# Patient Record
Sex: MALE | Race: WHITE | Employment: FULL TIME | URBAN - METROPOLITAN AREA
[De-identification: names, ages, dates, MRNs, and addresses within clinical notes are randomized per-mention and may not be internally consistent; named-entity substitution may affect disease eponyms.]

---

## 2017-05-16 ENCOUNTER — OFFICE VISIT (OUTPATIENT)
Dept: CARDIOLOGY CLINIC | Age: 57
End: 2017-05-16

## 2017-05-16 VITALS
DIASTOLIC BLOOD PRESSURE: 70 MMHG | WEIGHT: 277.12 LBS | SYSTOLIC BLOOD PRESSURE: 124 MMHG | HEIGHT: 73 IN | BODY MASS INDEX: 36.73 KG/M2 | HEART RATE: 98 BPM | OXYGEN SATURATION: 94 %

## 2017-05-16 DIAGNOSIS — R07.89 ATYPICAL CHEST PAIN: Primary | ICD-10-CM

## 2017-05-16 DIAGNOSIS — E78.5 DYSLIPIDEMIA: ICD-10-CM

## 2017-05-16 DIAGNOSIS — F17.210 CIGARETTE NICOTINE DEPENDENCE WITHOUT COMPLICATION: ICD-10-CM

## 2017-05-16 PROCEDURE — 99204 OFFICE O/P NEW MOD 45 MIN: CPT | Performed by: INTERNAL MEDICINE

## 2017-05-19 ENCOUNTER — HOSPITAL ENCOUNTER (OUTPATIENT)
Dept: NON INVASIVE DIAGNOSTICS | Age: 57
Discharge: HOME | End: 2017-05-19
Attending: INTERNAL MEDICINE | Admitting: INTERNAL MEDICINE

## 2018-03-19 ENCOUNTER — OFFICE VISIT (OUTPATIENT)
Dept: FAMILY MEDICINE CLINIC | Age: 58
End: 2018-03-19

## 2018-03-19 VITALS
TEMPERATURE: 98.4 F | WEIGHT: 276.4 LBS | DIASTOLIC BLOOD PRESSURE: 84 MMHG | SYSTOLIC BLOOD PRESSURE: 118 MMHG | HEIGHT: 73 IN | BODY MASS INDEX: 36.63 KG/M2

## 2018-03-19 DIAGNOSIS — Z12.11 SCREENING FOR COLON CANCER: ICD-10-CM

## 2018-03-19 DIAGNOSIS — Z00.00 PHYSICAL EXAM, ANNUAL: Primary | ICD-10-CM

## 2018-03-19 PROCEDURE — 99386 PREV VISIT NEW AGE 40-64: CPT | Performed by: INTERNAL MEDICINE

## 2018-03-19 ASSESSMENT — ENCOUNTER SYMPTOMS
SINUS PAIN: 0
ABDOMINAL PAIN: 0
APNEA: 0
DIARRHEA: 0
WHEEZING: 0
BLOOD IN STOOL: 0
COUGH: 0
SHORTNESS OF BREATH: 0
SINUS PRESSURE: 0
RHINORRHEA: 0
CONSTIPATION: 0

## 2018-03-19 ASSESSMENT — PATIENT HEALTH QUESTIONNAIRE - PHQ9
2. FEELING DOWN, DEPRESSED OR HOPELESS: 0
SUM OF ALL RESPONSES TO PHQ QUESTIONS 1-9: 0
1. LITTLE INTEREST OR PLEASURE IN DOING THINGS: 0
SUM OF ALL RESPONSES TO PHQ9 QUESTIONS 1 & 2: 0

## 2018-03-19 NOTE — PROGRESS NOTES
Subjective:      Patient ID: Ruddy Blackwood is a 60 y.o. male. HPI   Chief Complaint   Patient presents with   [facility]     patient denies any complaints at this time and states he will have fasting labs done at work on 4/9/2018. discuss smoking cessation. patient request Rx: Chantix     Ruddy Blackwood is a 60 y.o. male with the following history as recorded in [employer]: There are no active problems to display for this patient. No current outpatient prescriptions on file. No current facility-administered medications for this visit. Allergies: Patient has no known allergies. Past Medical History:   Diagnosis Date    Nicotine addiction      Past Surgical History:   Procedure Laterality Date    APPENDECTOMY      HAND SURGERY Left     JOINT REPLACEMENT Right     knee    KNEE SURGERY Bilateral     TONSILLECTOMY AND ADENOIDECTOMY       Family History   Problem Relation Age of Onset    Diabetes Mother     Colon Cancer Father      Social History   Substance Use Topics    Smoking status: Current Every Day Smoker     Packs/day: 1.00    Smokeless tobacco: Never Used      Comment: pt is thinking about quitting    Alcohol use Yes      Comment: occasional use       Review of Systems   Constitutional: Negative for chills, diaphoresis, fatigue and fever. HENT: Negative for congestion, postnasal drip, rhinorrhea, sinus pain and sinus pressure. Eyes: Negative for visual disturbance. Respiratory: Negative for apnea, cough, shortness of breath and wheezing. Cardiovascular: Negative for chest pain and palpitations. Gastrointestinal: Negative for abdominal pain, blood in stool, constipation and diarrhea. Endocrine: Negative for polyuria. Genitourinary: Negative for decreased urine volume and discharge. Musculoskeletal: Negative for arthralgias. Neurological: Negative for dizziness, light-headedness and headaches. Hematological: Negative for adenopathy.        Objective: Physical Exam   Constitutional: He is oriented to person, place, and time. He appears well-developed and well-nourished. No distress. HENT:   Head: Normocephalic and atraumatic. Right Ear: External ear normal.   Left Ear: External ear normal.   Mouth/Throat: Oropharynx is clear and moist.   Eyes: Conjunctivae are normal. Pupils are equal, round, and reactive to light. Right eye exhibits no discharge. Left eye exhibits no discharge. Neck: No tracheal deviation present. No thyromegaly present. Cardiovascular: Normal rate. No murmur heard. Pulmonary/Chest: Effort normal and breath sounds normal. No respiratory distress. He has no wheezes. He has no rales. Abdominal: He exhibits no distension. There is no tenderness. There is no rebound and no guarding. Musculoskeletal: He exhibits no edema. Neurological: He is alert and oriented to person, place, and time. No cranial nerve deficit. Coordination normal.   Skin: No rash noted. He is not diaphoretic. Psychiatric: He has a normal mood and affect. His behavior is normal. Judgment and thought content normal.       Assessment:      1. Physical exam, annual     2. Screening for colon cancer  [facility] - Karan Plascencia MD ([facility])         Plan:      No outpatient encounter prescriptions on file as of 3/19/2018. No facility-administered encounter medications on file as of 3/19/2018.       Orders Placed This Encounter   Procedures   Marky Kelly MD ([facility])     Referral Priority:   Routine     Referral Type:   Consult for Advice and Opinion     Referral Reason:   Specialty Services Required     Referred to Provider:   Barbara Chen MD     Requested Specialty:   Gastroenterology     Number of Visits Requested:   1

## 2018-04-18 PROBLEM — Z00.00 PHYSICAL EXAM, ANNUAL: Status: RESOLVED | Noted: 2018-03-19 | Resolved: 2018-04-18

## 2019-04-17 ENCOUNTER — OFFICE VISIT (OUTPATIENT)
Dept: FAMILY MEDICINE CLINIC | Age: 59
End: 2019-04-17
Payer: COMMERCIAL

## 2019-04-17 VITALS
SYSTOLIC BLOOD PRESSURE: 124 MMHG | WEIGHT: 271.6 LBS | BODY MASS INDEX: 36 KG/M2 | HEIGHT: 73 IN | DIASTOLIC BLOOD PRESSURE: 78 MMHG | TEMPERATURE: 97.3 F

## 2019-04-17 DIAGNOSIS — Z12.5 SCREENING PSA (PROSTATE SPECIFIC ANTIGEN): ICD-10-CM

## 2019-04-17 DIAGNOSIS — Z00.00 PHYSICAL EXAM: Primary | ICD-10-CM

## 2019-04-17 DIAGNOSIS — Z13.220 SCREENING FOR LIPID DISORDERS: ICD-10-CM

## 2019-04-17 DIAGNOSIS — Z11.59 NEED FOR HEPATITIS C SCREENING TEST: ICD-10-CM

## 2019-04-17 DIAGNOSIS — Z12.11 SCREENING FOR COLON CANCER: ICD-10-CM

## 2019-04-17 DIAGNOSIS — Z13.1 SCREENING FOR DIABETES MELLITUS: ICD-10-CM

## 2019-04-17 LAB
CHOLESTEROL, TOTAL: 163 MG/DL (ref 0–199)
GLUCOSE BLD-MCNC: 106 MG/DL (ref 70–99)
HDLC SERPL-MCNC: 39 MG/DL (ref 40–60)
HEPATITIS C ANTIBODY INTERPRETATION: NORMAL
LDL CHOLESTEROL CALCULATED: 101 MG/DL
PROSTATE SPECIFIC ANTIGEN: 1.86 NG/ML (ref 0–4)
TRIGL SERPL-MCNC: 115 MG/DL (ref 0–150)
VLDLC SERPL CALC-MCNC: 23 MG/DL

## 2019-04-17 PROCEDURE — 99396 PREV VISIT EST AGE 40-64: CPT | Performed by: INTERNAL MEDICINE

## 2019-04-17 ASSESSMENT — ENCOUNTER SYMPTOMS
VOMITING: 0
SORE THROAT: 0
DIARRHEA: 0
WHEEZING: 0
CONSTIPATION: 0
ABDOMINAL PAIN: 0
SINUS PRESSURE: 0
NAUSEA: 0
APNEA: 0
SHORTNESS OF BREATH: 0
BLOOD IN STOOL: 0
RHINORRHEA: 0
SINUS PAIN: 0
COUGH: 0

## 2019-04-17 ASSESSMENT — PATIENT HEALTH QUESTIONNAIRE - PHQ9
SUM OF ALL RESPONSES TO PHQ QUESTIONS 1-9: 0
SUM OF ALL RESPONSES TO PHQ9 QUESTIONS 1 & 2: 0
2. FEELING DOWN, DEPRESSED OR HOPELESS: 0
1. LITTLE INTEREST OR PLEASURE IN DOING THINGS: 0
SUM OF ALL RESPONSES TO PHQ QUESTIONS 1-9: 0

## 2019-04-17 NOTE — PROGRESS NOTES
There is no immunization history on file for this patient.
Objective:   Physical Exam   Constitutional: He is oriented to person, place, and time. He appears well-developed and well-nourished. No distress. HENT:   Head: Normocephalic and atraumatic. Right Ear: External ear normal.   Left Ear: External ear normal.   Nose: Nose normal.   Eyes: Pupils are equal, round, and reactive to light. EOM are normal. Right eye exhibits no discharge. Left eye exhibits no discharge. No scleral icterus. Neck: No JVD present. No tracheal deviation present. No thyromegaly present. Cardiovascular: Normal rate and regular rhythm. No murmur heard. Pulmonary/Chest: Effort normal and breath sounds normal. No respiratory distress. He has no wheezes. Abdominal: Soft. He exhibits no distension and no mass. There is no tenderness. There is no rebound and no guarding. Musculoskeletal: He exhibits no edema or deformity. Lymphadenopathy:     He has no cervical adenopathy. Neurological: He is alert and oriented to person, place, and time. No cranial nerve deficit. Skin: No rash noted. He is not diaphoretic. No erythema. Psychiatric: He has a normal mood and affect. His behavior is normal. Judgment and thought content normal.       Assessment:       Diagnosis Orders   1. Physical exam     2. Screening for lipid disorders  Lipid Panel   3. Screening for diabetes mellitus  Glucose   4. Screening for colon cancer     5. Need for hepatitis C screening test  Hepatitis C Antibody   6. Screening PSA (prostate specific antigen)  Psa screening         Plan:      No outpatient encounter medications on file as of 4/17/2019. No facility-administered encounter medications on file as of 4/17/2019.       Orders Placed This Encounter   Procedures    Lipid Panel     Standing Status:   Future     Standing Expiration Date:   4/17/2020     Order Specific Question:   Is Patient Fasting?/# of Hours     Answer:   12    Glucose     Standing Status:   Future     Standing Expiration Date:

## 2019-04-17 NOTE — PATIENT INSTRUCTIONS
Thank you for choosing [facility]. Please bring a current list of medications to every appointment. Please contact your pharmacy for any prescription refill(s) that you are requesting. Cigarette Smoking and Its Health Risks   GENERAL INFORMATION:   Smoking and your health: Cigarette smoking is the most preventable cause of illness and death in the [facility]. A large number of Americans smoke cigarettes, and each year more than one million children and adults start smoking cigarettes. Many people die every year from illnesses caused by smoking. People who smoke die earlier than those who do not smoke. The risk of disease increases if you smoke a lot, inhale deeply, or have smoked many years. Why are cigarettes bad for you? Cigarettes are filled with poison that goes into the lungs when you inhale. Coughing, dizziness, and burning of the eyes, nose, and throat are early signs that smoking is harming you. Smoking increases your health risks if you have diabetes, high blood pressure, or high blood cholesterol. The long-term problems of smoking cigarettes are the following:   Cancer: Smoking increases your chances of getting cancer. Cigarette smoking may play a role in developing many kinds of cancer. Lung cancer is the most common kind of cancer caused by smoking. A smoker is at greater risk of getting cancer of the lips, mouth, throat, or voice box. Smokers also have a higher risk of getting esophagus, stomach, kidney, pancreas, cervix, bladder, and skin cancer. Heart and blood vessel disease: If you already have heart or blood vessel problems and smoke, you are at even greater risk of having continued or worse health problems. The nicotine in the tobacco causes an increase in your heart rate and blood pressure. The arteries (blood vessels) in your arms and legs tighten and narrow because of the nicotine in cigarette smoke.  Cigarette smoke increases blood clotting, and may damage the lining of your heart's arteries and other blood vessels. Carbon monoxide is a harmful gas that gets into the blood and decreases oxygen going to the heart and the body. Cigarette smoke contains this gas. Hardening of the arteries happens more often in smokers than in nonsmokers. This may make it more likely for you to have a stroke (blood clot in your brain). The more cigarettes you smoke, the greater your risk of a heart attack. Lung disease: The younger you are when you start smoking, the greater your risk of getting lung diseases. Many smokers have a cough which is caused by the chemicals in smoke. These chemicals harm the cilia (tiny hairs) that line the lungs and help remove dirt and waste products. Depending upon how much you smoke, your lungs become gray and \"dirty\" (they look like charcoal). Healthy lungs are pink. Chronic bronchitis is a serious lung infection which is often caused by smoking. Emphysema is a long-term lung disease that may be caused by smoking cigarettes. Cigarette smoking also makes asthma worse. You are at a higher risk of getting colds, pneumonia, and other lung infections if you smoke. Gastrointestinal disease: Cigarette smoking increases the amount of acid that is made by your stomach, and may cause a peptic ulcer. A peptic ulcer is an open sore in the stomach or duodenum (part of the intestine). You may also get gastroesophageal reflux from smoking. This is when you have a backflow of stomach acid into your esophagus (food tube). Other problems: The following are other problems that smoking may cause: Bad breath. Bad smell in your clothes, hair, and skin. Decreased ability to play sports or do physical activities because of breathing problems. Earlier than normal wrinkling of the skin, usually the face. Higher risk of bone fractures, such as hip, wrist, or spine. Higher risk of starting a fire.  This may happen if you fall asleep with a lit cigarette. Men may have problems having an erection. Sleeping problems. Smoking is an expensive (costly) habit. You will save money if you choose to stop smoking. Sore throat. Staining of teeth. Women and smoking: You may have a higher risk of having a heart attack or stroke if you smoke and use birth control pills. This risk is more serious if you are 35 years or older. The risk of losing your unborn baby or having a stillborn baby is higher if you are pregnant and smoke. Babies born to smoking mothers often weigh less, and are at a higher risk of sudden infant death syndrome (SIDS). You may have a harder time getting pregnant if you are a smoker. Women who smoke may have a higher risk of osteoporosis (also known as \"brittle bones\"). Women who smoke also have a higher risk of incontinence, which is when you are unable to control when you urinate. Are there risks with smoking cigars or pipes? The risks are the same for people who smoke cigars or pipes as they are for cigarette smokers. There is a risk of getting cancer of the mouth, lip, larynx (voice box), or esophagus if you smoke a cigar or pipe. What are the risks of using snuff or chewing tobacco (\"smokeless tobacco\")? People who use snuff or chewing tobacco have an increased risk of getting mouth or throat cancer. The risk of heart disease, stroke, blood vessel disease and stomach problems is the same as it is for cigarette smokers. What is \"passive smoking\"? Tobacco smoke is dangerous to others. The effect that smoking has on nonsmokers is called \"passive smoking\". Nonsmokers who breathe tobacco smoke have the same health risks as smokers. Children who are around tobacco smoke may have more colds, ear infections, or other breathing problems. Why should I quit smoking? The benefits from quitting smoking happen right away. Your sense of taste and smell will improve. Your body, clothes, car, and home will not smell of tobacco smoke.  Your chance of getting cancer will be reduced as compared to a person who does not quit. As a former smoker, you will live longer than people who continue to smoke. Women who quit smoking before getting pregnant have a better chance of having a healthy baby. You will decrease the health risks of nonsmokers if you stop smoking. By stopping smoking you will also save money. What is the best way to stop smoking? A large percentage of people have tried to quit smoking at least once. Most people who try to quit smoking go through a series of stages. Following are the stages you may go through to stop smoking: Thinking about quitting. Deciding to quit on a certain day. Quitting smoking. Successfully staying an ex-smoker. You must be strong in order to quit smoking. When you decide to quit, you can get help from your caregiver or others. You will learn that there are many ways to stop smoking. Talk to your caregiver about the best method for you when you are ready to quit smoking. Ask your caregiver for more information about how to stop smoking. Call or write the following for more information about the risks of smoking. Smokefree. gov  Phone: 9-457.547.1372  Web Address: www.smokefree. gov  American [employer] Association  [address] [facility]. [address]  Phone: 8-3-898.319.8603  Phone: 8-8-508--135-8092  Web Address: [employer]. [address] [place] [address]  Phone: 4-114.495.9027  Web Address: http://[employer]/. gov   CARE AGREEMENT:   You have the right to help plan your care. To help with this plan, you must learn about your health condition and how it may be treated. You can then discuss treatment options with your caregivers. Work with them to decide what care may be used to treat you. You always have the right to refuse treatment. Copyright © 2009. [employer] All rights reserved.  Information is for End User's use only and may not be sold, redistributed or otherwise used for commercial purposes. The above information is an  only. It is not intended as medical advice for individual conditions or treatments. Talk to your doctor, nurse or pharmacist before following any medical regimen to see if it is safe and effective for you.

## 2021-08-16 ENCOUNTER — TELEPHONE (OUTPATIENT)
Dept: FAMILY MEDICINE CLINIC | Age: 61
End: 2021-08-16

## 2021-08-16 NOTE — TELEPHONE ENCOUNTER
----- Message from Edilberto Figueroa sent at 8/16/2021 10:08 AM EDT -----  Subject: Message to Provider    QUESTIONS  Information for Provider? Patient would like to know if he has to Fast for   the visit. He would like to know if there is an early appt.  ---------------------------------------------------------------------------  --------------  CALL BACK INFO  What is the best way for the office to contact you? OK to leave message on   voicemail  Preferred Call Back Phone Number? [phone]  ---------------------------------------------------------------------------  --------------  SCRIPT ANSWERS  Relationship to Patient?  Self

## 2021-08-18 ENCOUNTER — OFFICE VISIT (OUTPATIENT)
Dept: FAMILY MEDICINE CLINIC | Age: 61
End: 2021-08-18
Payer: COMMERCIAL

## 2021-08-18 VITALS
HEIGHT: 73 IN | BODY MASS INDEX: 35.97 KG/M2 | DIASTOLIC BLOOD PRESSURE: 80 MMHG | SYSTOLIC BLOOD PRESSURE: 122 MMHG | WEIGHT: 271.4 LBS | TEMPERATURE: 98.1 F

## 2021-08-18 DIAGNOSIS — Z13.220 SCREENING FOR LIPID DISORDERS: ICD-10-CM

## 2021-08-18 DIAGNOSIS — Z00.00 PHYSICAL EXAM: Primary | ICD-10-CM

## 2021-08-18 DIAGNOSIS — Z13.1 SCREENING FOR DIABETES MELLITUS: ICD-10-CM

## 2021-08-18 DIAGNOSIS — Z12.5 SCREENING PSA (PROSTATE SPECIFIC ANTIGEN): ICD-10-CM

## 2021-08-18 PROCEDURE — 99396 PREV VISIT EST AGE 40-64: CPT | Performed by: INTERNAL MEDICINE

## 2021-08-18 SDOH — ECONOMIC STABILITY: FOOD INSECURITY: WITHIN THE PAST 12 MONTHS, THE FOOD YOU BOUGHT JUST DIDN'T LAST AND YOU DIDN'T HAVE MONEY TO GET MORE.: NEVER TRUE

## 2021-08-18 SDOH — ECONOMIC STABILITY: FOOD INSECURITY: WITHIN THE PAST 12 MONTHS, YOU WORRIED THAT YOUR FOOD WOULD RUN OUT BEFORE YOU GOT MONEY TO BUY MORE.: NEVER TRUE

## 2021-08-18 ASSESSMENT — ENCOUNTER SYMPTOMS
SORE THROAT: 0
BLOOD IN STOOL: 0
COUGH: 0
ABDOMINAL PAIN: 0
VOMITING: 0
SINUS PAIN: 0
APNEA: 0
SINUS PRESSURE: 0
WHEEZING: 0
RHINORRHEA: 0
DIARRHEA: 0
SHORTNESS OF BREATH: 0
CONSTIPATION: 0
NAUSEA: 0

## 2021-08-18 ASSESSMENT — PATIENT HEALTH QUESTIONNAIRE - PHQ9
1. LITTLE INTEREST OR PLEASURE IN DOING THINGS: 0
SUM OF ALL RESPONSES TO PHQ9 QUESTIONS 1 & 2: 0
SUM OF ALL RESPONSES TO PHQ QUESTIONS 1-9: 0
SUM OF ALL RESPONSES TO PHQ QUESTIONS 1-9: 0
2. FEELING DOWN, DEPRESSED OR HOPELESS: 0
SUM OF ALL RESPONSES TO PHQ QUESTIONS 1-9: 0

## 2021-08-18 ASSESSMENT — SOCIAL DETERMINANTS OF HEALTH (SDOH): HOW HARD IS IT FOR YOU TO PAY FOR THE VERY BASICS LIKE FOOD, HOUSING, MEDICAL CARE, AND HEATING?: NOT HARD AT ALL

## 2021-08-18 NOTE — PROGRESS NOTES
Nunu Lozano (:  1960) is a 57 y.o. male,Established patient, here for evaluation of the following chief complaint(s): Annual Exam (physical exam- patient request fasting lab order) and Diarrhea (patient c/o episodes of diarrhea x 6 months. patient denies abdominal pain and blood in stool)    Chief Complaint   Patient presents with    Annual Exam     physical exam- patient request fasting lab order    Diarrhea     patient c/o episodes of diarrhea x 6 months. patient denies abdominal pain and blood in stool     Nunu Lozano is a 57 y.o. male with the following history as recorded in [facility]: There are no problems to display for this patient. No current outpatient medications on file. No current facility-administered medications for this visit. Allergies: Patient has no known allergies. Past Medical History:   Diagnosis Date    Nicotine addiction      Past Surgical History:   Procedure Laterality Date    APPENDECTOMY      HAND SURGERY Left     JOINT REPLACEMENT Right     knee    KNEE SURGERY Bilateral     TONSILLECTOMY AND ADENOIDECTOMY       Family History   Problem Relation Age of Onset    Diabetes Mother     Colon Cancer Father      Social History     Tobacco Use    Smoking status: Current Every Day Smoker     Packs/day: 1.00    Smokeless tobacco: Never Used    Tobacco comment: pt is thinking about quitting   Substance Use Topics    Alcohol use: Yes     Comment: occasional use          ASSESSMENT/PLAN:  1. Screening for lipid disorders  2. Screening for diabetes mellitus  3. Screening PSA (prostate specific antigen)     Diagnosis Orders   1. Physical exam     2. Screening for lipid disorders  Lipid Panel   3. Screening for diabetes mellitus  Glucose, Fasting   4. Screening PSA (prostate specific antigen)  PSA screening     No outpatient encounter medications on file as of 2021. No facility-administered encounter medications on file as of 2021.      Orders Placed This Encounter   Procedures    Lipid Panel     Standing Status:   Future     Standing Expiration Date:   8/18/2022     Order Specific Question:   Is Patient Fasting?/# of Hours     Answer:   12    Glucose, Fasting     Standing Status:   Future     Standing Expiration Date:   8/18/2022    PSA screening     Standing Status:   Future     Standing Expiration Date:   8/18/2022   no weight loss 2 episodes in 6 months . Refer to GI if it continues . Neg colonoscopy  2 years ago     No follow-ups on file. Subjective   SUBJECTIVE/OBJECTIVE:  HPI    Review of Systems   Constitutional: Negative for chills, diaphoresis, fatigue and fever. HENT: Negative for congestion, postnasal drip, rhinorrhea, sinus pressure, sinus pain and sore throat. Eyes: Negative for visual disturbance. Respiratory: Negative for apnea, cough, shortness of breath and wheezing. Cardiovascular: Negative for chest pain and palpitations. Gastrointestinal: Negative for abdominal pain, blood in stool, constipation, diarrhea, nausea and vomiting. Endocrine: Negative for polyuria. Genitourinary: Negative for dysuria, frequency, hematuria and urgency. Musculoskeletal: Negative for arthralgias and myalgias. Skin: Negative for rash. Neurological: Negative for dizziness, syncope, weakness, numbness and headaches. Hematological: Negative for adenopathy. Objective   Physical Exam  Vitals and nursing note reviewed. Constitutional:       General: He is not in acute distress. Appearance: Normal appearance. He is not ill-appearing, toxic-appearing or diaphoretic. HENT:      Head: Normocephalic and atraumatic. Right Ear: Tympanic membrane, ear canal and external ear normal.      Left Ear: Tympanic membrane, ear canal and external ear normal.   Eyes:      General: No scleral icterus. Right eye: No discharge. Left eye: No discharge. Extraocular Movements: Extraocular movements intact. Pupils: Pupils are equal, round, and reactive to light. Cardiovascular:      Rate and Rhythm: Normal rate and regular rhythm. Heart sounds: No murmur heard. Pulmonary:      Effort: No respiratory distress. Breath sounds: No wheezing or rhonchi. Abdominal:      General: There is no distension. Tenderness: There is no abdominal tenderness. There is no guarding or rebound. Musculoskeletal:         General: No swelling or deformity. Cervical back: No rigidity. Lymphadenopathy:      Cervical: No cervical adenopathy. Skin:     Coloration: Skin is not jaundiced. Neurological:      General: No focal deficit present. Mental Status: He is alert. Cranial Nerves: No cranial nerve deficit. Motor: No weakness. Psychiatric:         Mood and Affect: Mood normal.         Thought Content:  Thought content normal.                  An electronic signature was used to authenticate this note.    --Jerry Hayes DO

## 2021-08-18 NOTE — PATIENT INSTRUCTIONS
Thank you for choosing [facility]. Please bring a current list of medications to every appointment. Please contact your pharmacy for any prescription refill(s) that you are requesting. Cigarette Smoking and Its Health Risks   GENERAL INFORMATION:   Smoking and your health: Cigarette smoking is the most preventable cause of illness and death in the [place]. A large number of Americans smoke cigarettes, and each year more than one million children and adults start smoking cigarettes. Many people die every year from illnesses caused by smoking. People who smoke die earlier than those who do not smoke. The risk of disease increases if you smoke a lot, inhale deeply, or have smoked many years. Why are cigarettes bad for you? Cigarettes are filled with poison that goes into the lungs when you inhale. Coughing, dizziness, and burning of the eyes, nose, and throat are early signs that smoking is harming you. Smoking increases your health risks if you have diabetes, high blood pressure, or high blood cholesterol. The long-term problems of smoking cigarettes are the following:   Cancer: Smoking increases your chances of getting cancer. Cigarette smoking may play a role in developing many kinds of cancer. Lung cancer is the most common kind of cancer caused by smoking. A smoker is at greater risk of getting cancer of the lips, mouth, throat, or voice box. Smokers also have a higher risk of getting esophagus, stomach, kidney, pancreas, cervix, bladder, and skin cancer. Heart and blood vessel disease: If you already have heart or blood vessel problems and smoke, you are at even greater risk of having continued or worse health problems. The nicotine in the tobacco causes an increase in your heart rate and blood pressure. The arteries (blood vessels) in your arms and legs tighten and narrow because of the nicotine in cigarette smoke.  Cigarette smoke increases blood clotting, and may damage the lining of your heart's arteries and other blood vessels. Carbon monoxide is a harmful gas that gets into the blood and decreases oxygen going to the heart and the body. Cigarette smoke contains this gas. Hardening of the arteries happens more often in smokers than in nonsmokers. This may make it more likely for you to have a stroke (blood clot in your brain). The more cigarettes you smoke, the greater your risk of a heart attack. Lung disease: The younger you are when you start smoking, the greater your risk of getting lung diseases. Many smokers have a cough which is caused by the chemicals in smoke. These chemicals harm the cilia (tiny hairs) that line the lungs and help remove dirt and waste products. Depending upon how much you smoke, your lungs become gray and \"dirty\" (they look like charcoal). Healthy lungs are pink. Chronic bronchitis is a serious lung infection which is often caused by smoking. Emphysema is a long-term lung disease that may be caused by smoking cigarettes. Cigarette smoking also makes asthma worse. You are at a higher risk of getting colds, pneumonia, and other lung infections if you smoke. Gastrointestinal disease: Cigarette smoking increases the amount of acid that is made by your stomach, and may cause a peptic ulcer. A peptic ulcer is an open sore in the stomach or duodenum (part of the intestine). You may also get gastroesophageal reflux from smoking. This is when you have a backflow of stomach acid into your esophagus (food tube). Other problems: The following are other problems that smoking may cause: Bad breath. Bad smell in your clothes, hair, and skin. Decreased ability to play sports or do physical activities because of breathing problems. Earlier than normal wrinkling of the skin, usually the face. Higher risk of bone fractures, such as hip, wrist, or spine. Higher risk of starting a fire.  This may happen if you fall asleep with a lit cigarette. Men may have problems having an erection. Sleeping problems. Smoking is an expensive (costly) habit. You will save money if you choose to stop smoking. Sore throat. Staining of teeth. Women and smoking: You may have a higher risk of having a heart attack or stroke if you smoke and use birth control pills. This risk is more serious if you are 35 years or older. The risk of losing your unborn baby or having a stillborn baby is higher if you are pregnant and smoke. Babies born to smoking mothers often weigh less, and are at a higher risk of sudden infant death syndrome (SIDS). You may have a harder time getting pregnant if you are a smoker. Women who smoke may have a higher risk of osteoporosis (also known as \"brittle bones\"). Women who smoke also have a higher risk of incontinence, which is when you are unable to control when you urinate. Are there risks with smoking cigars or pipes? The risks are the same for people who smoke cigars or pipes as they are for cigarette smokers. There is a risk of getting cancer of the mouth, lip, larynx (voice box), or esophagus if you smoke a cigar or pipe. What are the risks of using snuff or chewing tobacco (\"smokeless tobacco\")? People who use snuff or chewing tobacco have an increased risk of getting mouth or throat cancer. The risk of heart disease, stroke, blood vessel disease and stomach problems is the same as it is for cigarette smokers. What is \"passive smoking\"? Tobacco smoke is dangerous to others. The effect that smoking has on nonsmokers is called \"passive smoking\". Nonsmokers who breathe tobacco smoke have the same health risks as smokers. Children who are around tobacco smoke may have more colds, ear infections, or other breathing problems. Why should I quit smoking? The benefits from quitting smoking happen right away. Your sense of taste and smell will improve. Your body, clothes, car, and home will not smell of tobacco smoke.  Your chance of getting cancer will be reduced as compared to a person who does not quit. As a former smoker, you will live longer than people who continue to smoke. Women who quit smoking before getting pregnant have a better chance of having a healthy baby. You will decrease the health risks of nonsmokers if you stop smoking. By stopping smoking you will also save money. What is the best way to stop smoking? A large percentage of people have tried to quit smoking at least once. Most people who try to quit smoking go through a series of stages. Following are the stages you may go through to stop smoking: Thinking about quitting. Deciding to quit on a certain day. Quitting smoking. Successfully staying an ex-smoker. You must be strong in order to quit smoking. When you decide to quit, you can get help from your caregiver or others. You will learn that there are many ways to stop smoking. Talk to your caregiver about the best method for you when you are ready to quit smoking. Ask your caregiver for more information about how to stop smoking. Call or write the following for more information about the risks of smoking. Smokefree. gov  Phone: 0-886.754.1779  Web Address: www.smokefree. gov  [employer]  [address] [place] [address],[address]. [address]  Phone: 4-9-656-874.881.9417  Phone: 2-8-449-876--210-6354  Web Address: [employer]. [address] [place] [address]  Phone: 9-384.338.3294  Web Address: http://[employer]/. gov   CARE AGREEMENT:   You have the right to help plan your care. To help with this plan, you must learn about your health condition and how it may be treated. You can then discuss treatment options with your caregivers. Work with them to decide what care may be used to treat you. You always have the right to refuse treatment. Copyright © 2009. [employer] All rights reserved.  Information is for End User's use only and may not be sold, redistributed or otherwise used for commercial purposes. The above information is an  only. It is not intended as medical advice for individual conditions or treatments. Talk to your doctor, nurse or pharmacist before following any medical regimen to see if it is safe and effective for you.

## 2021-08-25 ENCOUNTER — TELEPHONE (OUTPATIENT)
Dept: FAMILY MEDICINE CLINIC | Age: 61
End: 2021-08-25

## 2023-04-04 ENCOUNTER — OFFICE VISIT (OUTPATIENT)
Dept: FAMILY MEDICINE CLINIC | Age: 63
End: 2023-04-04

## 2023-04-04 VITALS
WEIGHT: 280 LBS | SYSTOLIC BLOOD PRESSURE: 130 MMHG | BODY MASS INDEX: 37.11 KG/M2 | DIASTOLIC BLOOD PRESSURE: 72 MMHG | TEMPERATURE: 98.4 F | HEIGHT: 73 IN

## 2023-04-04 DIAGNOSIS — Z13.1 SCREENING FOR DIABETES MELLITUS: ICD-10-CM

## 2023-04-04 DIAGNOSIS — Z13.220 SCREENING FOR LIPID DISORDERS: ICD-10-CM

## 2023-04-04 DIAGNOSIS — R20.2 TINGLING OF UPPER EXTREMITY: ICD-10-CM

## 2023-04-04 DIAGNOSIS — Z00.00 PHYSICAL EXAM: Primary | ICD-10-CM

## 2023-04-04 DIAGNOSIS — Z12.5 SCREENING PSA (PROSTATE SPECIFIC ANTIGEN): ICD-10-CM

## 2023-04-04 LAB
CHOLEST SERPL-MCNC: 175 MG/DL (ref 0–199)
GLUCOSE P FAST SERPL-MCNC: 124 MG/DL (ref 70–99)
HDLC SERPL-MCNC: 48 MG/DL (ref 40–60)
LDLC SERPL CALC-MCNC: 105 MG/DL
PSA SERPL DL<=0.01 NG/ML-MCNC: 3.16 NG/ML (ref 0–4)
TRIGL SERPL-MCNC: 111 MG/DL (ref 0–150)
VLDLC SERPL CALC-MCNC: 22 MG/DL

## 2023-04-04 PROCEDURE — 99396 PREV VISIT EST AGE 40-64: CPT | Performed by: INTERNAL MEDICINE

## 2023-04-04 SDOH — ECONOMIC STABILITY: FOOD INSECURITY: WITHIN THE PAST 12 MONTHS, YOU WORRIED THAT YOUR FOOD WOULD RUN OUT BEFORE YOU GOT MONEY TO BUY MORE.: NEVER TRUE

## 2023-04-04 SDOH — ECONOMIC STABILITY: INCOME INSECURITY: HOW HARD IS IT FOR YOU TO PAY FOR THE VERY BASICS LIKE FOOD, HOUSING, MEDICAL CARE, AND HEATING?: NOT HARD AT ALL

## 2023-04-04 SDOH — ECONOMIC STABILITY: HOUSING INSECURITY
IN THE LAST 12 MONTHS, WAS THERE A TIME WHEN YOU DID NOT HAVE A STEADY PLACE TO SLEEP OR SLEPT IN A SHELTER (INCLUDING NOW)?: NO

## 2023-04-04 SDOH — ECONOMIC STABILITY: FOOD INSECURITY: WITHIN THE PAST 12 MONTHS, THE FOOD YOU BOUGHT JUST DIDN'T LAST AND YOU DIDN'T HAVE MONEY TO GET MORE.: NEVER TRUE

## 2023-04-04 ASSESSMENT — PATIENT HEALTH QUESTIONNAIRE - PHQ9
1. LITTLE INTEREST OR PLEASURE IN DOING THINGS: 0
SUM OF ALL RESPONSES TO PHQ QUESTIONS 1-9: 0
SUM OF ALL RESPONSES TO PHQ9 QUESTIONS 1 & 2: 0
2. FEELING DOWN, DEPRESSED OR HOPELESS: 0

## 2023-04-04 ASSESSMENT — ENCOUNTER SYMPTOMS
WHEEZING: 0
NAUSEA: 0
RHINORRHEA: 0
SHORTNESS OF BREATH: 0
CONSTIPATION: 0
BLOOD IN STOOL: 0
APNEA: 0
COUGH: 0
ABDOMINAL PAIN: 0
SORE THROAT: 0
SINUS PAIN: 0
SINUS PRESSURE: 0
DIARRHEA: 0

## 2023-04-04 NOTE — PATIENT INSTRUCTIONS
Thank you for choosing [facility]. Please bring a current list of medications to every appointment. Please contact your pharmacy for any prescription refill(s) that you are requesting.

## 2023-04-04 NOTE — PROGRESS NOTES
Year: Not on file    Unstable Housing in the Last Year: No        Family History   Problem Relation Age of Onset    Diabetes Mother     Colon Cancer Father        ADVANCE DIRECTIVE: N, <no information>    Vitals:    04/04/23 0818   BP: 130/72   Site: Right Upper Arm   Position: Sitting   Cuff Size: Large Adult   Temp: 98.4 °F (36.9 °C)   TempSrc: Temporal   Weight: 280 lb (127 kg)   Height: 6' 1\" (1.854 m)     Estimated body mass index is 36.94 kg/m² as calculated from the following:    Height as of this encounter: 6' 1\" (1.854 m). Weight as of this encounter: 280 lb (127 kg). Physical Exam  Vitals reviewed. Constitutional:       General: He is not in acute distress. Appearance: Normal appearance. He is not ill-appearing. HENT:      Head: Normocephalic and atraumatic. Right Ear: Ear canal and external ear normal.      Left Ear: Tympanic membrane, ear canal and external ear normal.   Eyes:      General:         Right eye: No discharge. Left eye: No discharge. Extraocular Movements: Extraocular movements intact. Conjunctiva/sclera: Conjunctivae normal.      Pupils: Pupils are equal, round, and reactive to light. Cardiovascular:      Rate and Rhythm: Normal rate and regular rhythm. Heart sounds: No murmur heard. Pulmonary:      Effort: No respiratory distress. Breath sounds: No rhonchi. Abdominal:      General: There is no distension. Tenderness: There is no abdominal tenderness. There is no guarding or rebound. Musculoskeletal:         General: No swelling or deformity. Cervical back: No rigidity. Skin:     Coloration: Skin is not jaundiced. Findings: No rash. Neurological:      Mental Status: He is alert. Cranial Nerves: No cranial nerve deficit. Motor: No weakness. Psychiatric:         Mood and Affect: Mood normal.         Thought Content: Thought content normal.       No flowsheet data found.     Lab Results   Component Value
WDL

## 2023-04-05 DIAGNOSIS — R73.01 IMPAIRED FASTING GLUCOSE: Primary | ICD-10-CM

## 2023-04-07 DIAGNOSIS — R73.01 IMPAIRED FASTING GLUCOSE: ICD-10-CM

## 2023-04-07 LAB
EST. AVERAGE GLUCOSE BLD GHB EST-MCNC: 122.6 MG/DL
HBA1C MFR BLD: 5.9 %

## 2024-04-05 ENCOUNTER — OFFICE VISIT (OUTPATIENT)
Dept: FAMILY MEDICINE CLINIC | Age: 64
End: 2024-04-05
Payer: MEDICARE

## 2024-04-05 VITALS
SYSTOLIC BLOOD PRESSURE: 112 MMHG | BODY MASS INDEX: 36.92 KG/M2 | WEIGHT: 278.6 LBS | TEMPERATURE: 97.4 F | HEIGHT: 73 IN | HEART RATE: 68 BPM | OXYGEN SATURATION: 97 % | DIASTOLIC BLOOD PRESSURE: 70 MMHG

## 2024-04-05 DIAGNOSIS — Z76.89 ENCOUNTER TO ESTABLISH CARE: Primary | ICD-10-CM

## 2024-04-05 DIAGNOSIS — R73.03 PREDIABETES: ICD-10-CM

## 2024-04-05 DIAGNOSIS — R20.2 TINGLING OF UPPER EXTREMITY: ICD-10-CM

## 2024-04-05 LAB
EST. AVERAGE GLUCOSE BLD GHB EST-MCNC: 128.4 MG/DL
HBA1C MFR BLD: 6.1 %

## 2024-04-05 PROCEDURE — G8427 DOCREV CUR MEDS BY ELIG CLIN: HCPCS | Performed by: NURSE PRACTITIONER

## 2024-04-05 PROCEDURE — 1036F TOBACCO NON-USER: CPT | Performed by: NURSE PRACTITIONER

## 2024-04-05 PROCEDURE — 3017F COLORECTAL CA SCREEN DOC REV: CPT | Performed by: NURSE PRACTITIONER

## 2024-04-05 PROCEDURE — 1123F ACP DISCUSS/DSCN MKR DOCD: CPT | Performed by: NURSE PRACTITIONER

## 2024-04-05 PROCEDURE — 99214 OFFICE O/P EST MOD 30 MIN: CPT | Performed by: NURSE PRACTITIONER

## 2024-04-05 PROCEDURE — G8417 CALC BMI ABV UP PARAM F/U: HCPCS | Performed by: NURSE PRACTITIONER

## 2024-04-05 RX ORDER — PREDNISONE 10 MG/1
10 TABLET ORAL DAILY
Qty: 10 TABLET | Refills: 0 | Status: SHIPPED | OUTPATIENT
Start: 2024-04-05 | End: 2024-04-15

## 2024-04-05 SDOH — ECONOMIC STABILITY: FOOD INSECURITY: WITHIN THE PAST 12 MONTHS, YOU WORRIED THAT YOUR FOOD WOULD RUN OUT BEFORE YOU GOT MONEY TO BUY MORE.: NEVER TRUE

## 2024-04-05 SDOH — ECONOMIC STABILITY: FOOD INSECURITY: WITHIN THE PAST 12 MONTHS, THE FOOD YOU BOUGHT JUST DIDN'T LAST AND YOU DIDN'T HAVE MONEY TO GET MORE.: NEVER TRUE

## 2024-04-05 SDOH — ECONOMIC STABILITY: INCOME INSECURITY: HOW HARD IS IT FOR YOU TO PAY FOR THE VERY BASICS LIKE FOOD, HOUSING, MEDICAL CARE, AND HEATING?: NOT VERY HARD

## 2024-04-05 ASSESSMENT — PATIENT HEALTH QUESTIONNAIRE - PHQ9
SUM OF ALL RESPONSES TO PHQ QUESTIONS 1-9: 0
SUM OF ALL RESPONSES TO PHQ QUESTIONS 1-9: 0
1. LITTLE INTEREST OR PLEASURE IN DOING THINGS: NOT AT ALL
2. FEELING DOWN, DEPRESSED OR HOPELESS: NOT AT ALL
SUM OF ALL RESPONSES TO PHQ QUESTIONS 1-9: 0
SUM OF ALL RESPONSES TO PHQ9 QUESTIONS 1 & 2: 0
SUM OF ALL RESPONSES TO PHQ QUESTIONS 1-9: 0

## 2024-04-05 ASSESSMENT — ENCOUNTER SYMPTOMS
WHEEZING: 0
SHORTNESS OF BREATH: 1
ABDOMINAL PAIN: 0

## 2024-04-05 NOTE — PROGRESS NOTES
breath (JOHNSON). Negative for wheezing.    Cardiovascular:  Negative for chest pain, palpitations and leg swelling.   Gastrointestinal:  Negative for abdominal pain.   Genitourinary: Negative.    Skin: Negative.    Neurological:  Positive for numbness (and tingling in remy hands). Negative for dizziness and headaches.   Psychiatric/Behavioral:  Negative for sleep disturbance.           Objective   Physical Exam  Constitutional:       Appearance: He is obese.   HENT:      Head: Normocephalic and atraumatic.   Cardiovascular:      Rate and Rhythm: Normal rate and regular rhythm.      Heart sounds: Normal heart sounds.   Pulmonary:      Breath sounds: Decreased breath sounds present.   Musculoskeletal:      Right wrist: No tenderness. Normal pulse.      Left wrist: No tenderness. Normal pulse.      Right hand: Normal strength.      Left hand: Normal strength.      Right lower leg: No edema.      Left lower leg: No edema.      Comments: Remy positive tinel sign   Skin:     General: Skin is warm and dry.   Neurological:      Mental Status: He is alert.              --MANAS Dewitt - NP

## 2024-09-04 ENCOUNTER — TELEPHONE (OUTPATIENT)
Dept: FAMILY MEDICINE CLINIC | Age: 64
End: 2024-09-04

## 2024-09-04 NOTE — TELEPHONE ENCOUNTER
Patient due for AWV with Aure JOLLY LM for patient to call the office to schedule.   Patient maybe due for welcome to medicare.

## 2024-09-26 ENCOUNTER — OFFICE VISIT (OUTPATIENT)
Dept: FAMILY MEDICINE CLINIC | Age: 64
End: 2024-09-26

## 2024-09-26 VITALS
HEIGHT: 73 IN | SYSTOLIC BLOOD PRESSURE: 128 MMHG | WEIGHT: 277.8 LBS | HEART RATE: 84 BPM | OXYGEN SATURATION: 97 % | TEMPERATURE: 98.8 F | DIASTOLIC BLOOD PRESSURE: 72 MMHG | BODY MASS INDEX: 36.82 KG/M2

## 2024-09-26 DIAGNOSIS — R73.03 PREDIABETES: Primary | ICD-10-CM

## 2024-09-26 DIAGNOSIS — Z87.891 PERSONAL HISTORY OF TOBACCO USE: ICD-10-CM

## 2024-09-26 DIAGNOSIS — E78.00 ELEVATED LDL CHOLESTEROL LEVEL: ICD-10-CM

## 2024-09-26 DIAGNOSIS — R06.02 SOB (SHORTNESS OF BREATH) ON EXERTION: ICD-10-CM

## 2024-09-27 DIAGNOSIS — E78.00 ELEVATED LDL CHOLESTEROL LEVEL: ICD-10-CM

## 2024-09-27 DIAGNOSIS — R73.03 PREDIABETES: ICD-10-CM

## 2024-09-27 LAB
CHOLEST SERPL-MCNC: 171 MG/DL (ref 0–199)
HDLC SERPL-MCNC: 44 MG/DL (ref 40–60)
LDL CHOLESTEROL: 104 MG/DL
TRIGL SERPL-MCNC: 114 MG/DL (ref 0–150)
VLDLC SERPL CALC-MCNC: 23 MG/DL

## 2024-09-27 ASSESSMENT — ENCOUNTER SYMPTOMS
CHEST TIGHTNESS: 0
SHORTNESS OF BREATH: 1
ABDOMINAL PAIN: 0
WHEEZING: 0

## 2024-09-28 LAB
EST. AVERAGE GLUCOSE BLD GHB EST-MCNC: 131.2 MG/DL
HBA1C MFR BLD: 6.2 %

## 2024-10-07 ENCOUNTER — HOSPITAL ENCOUNTER (OUTPATIENT)
Dept: CT IMAGING | Age: 64
Discharge: HOME | End: 2024-10-07
Payer: MEDICARE

## 2024-10-07 DIAGNOSIS — Z87.891 PERSONAL HISTORY OF TOBACCO USE: ICD-10-CM

## 2024-10-07 PROCEDURE — 71271 CT THORAX LUNG CANCER SCR C-: CPT

## 2024-10-24 ENCOUNTER — OFFICE VISIT (OUTPATIENT)
Dept: FAMILY MEDICINE CLINIC | Age: 64
End: 2024-10-24
Payer: MEDICARE

## 2024-10-24 VITALS
SYSTOLIC BLOOD PRESSURE: 138 MMHG | OXYGEN SATURATION: 95 % | WEIGHT: 276.2 LBS | HEIGHT: 73 IN | BODY MASS INDEX: 36.6 KG/M2 | TEMPERATURE: 99.1 F | DIASTOLIC BLOOD PRESSURE: 76 MMHG | HEART RATE: 67 BPM

## 2024-10-24 DIAGNOSIS — Z00.00 WELCOME TO MEDICARE PREVENTIVE VISIT: Primary | ICD-10-CM

## 2024-10-24 DIAGNOSIS — Z23 NEED FOR PROPHYLACTIC VACCINATION AND INOCULATION AGAINST VARICELLA: ICD-10-CM

## 2024-10-24 PROCEDURE — G0402 INITIAL PREVENTIVE EXAM: HCPCS | Performed by: NURSE PRACTITIONER

## 2024-10-24 PROCEDURE — 3017F COLORECTAL CA SCREEN DOC REV: CPT | Performed by: NURSE PRACTITIONER

## 2024-10-24 PROCEDURE — 1123F ACP DISCUSS/DSCN MKR DOCD: CPT | Performed by: NURSE PRACTITIONER

## 2024-10-24 ASSESSMENT — PATIENT HEALTH QUESTIONNAIRE - PHQ9
SUM OF ALL RESPONSES TO PHQ QUESTIONS 1-9: 0
SUM OF ALL RESPONSES TO PHQ QUESTIONS 1-9: 0
SUM OF ALL RESPONSES TO PHQ9 QUESTIONS 1 & 2: 0
2. FEELING DOWN, DEPRESSED OR HOPELESS: NOT AT ALL
SUM OF ALL RESPONSES TO PHQ QUESTIONS 1-9: 0
1. LITTLE INTEREST OR PLEASURE IN DOING THINGS: NOT AT ALL
SUM OF ALL RESPONSES TO PHQ QUESTIONS 1-9: 0

## 2024-10-24 ASSESSMENT — LIFESTYLE VARIABLES
HOW OFTEN DURING THE LAST YEAR HAVE YOU FOUND THAT YOU WERE NOT ABLE TO STOP DRINKING ONCE YOU HAD STARTED: NEVER
HOW OFTEN DURING THE LAST YEAR HAVE YOU BEEN UNABLE TO REMEMBER WHAT HAPPENED THE NIGHT BEFORE BECAUSE YOU HAD BEEN DRINKING: NEVER
HAS A RELATIVE, FRIEND, DOCTOR, OR ANOTHER HEALTH PROFESSIONAL EXPRESSED CONCERN ABOUT YOUR DRINKING OR SUGGESTED YOU CUT DOWN: NO
HOW MANY STANDARD DRINKS CONTAINING ALCOHOL DO YOU HAVE ON A TYPICAL DAY: 10 OR MORE
HAVE YOU OR SOMEONE ELSE BEEN INJURED AS A RESULT OF YOUR DRINKING: NO
HOW OFTEN DURING THE LAST YEAR HAVE YOU HAD A FEELING OF GUILT OR REMORSE AFTER DRINKING: NEVER
HOW OFTEN DO YOU HAVE A DRINK CONTAINING ALCOHOL: 2-3 TIMES A WEEK
HOW OFTEN DURING THE LAST YEAR HAVE YOU NEEDED AN ALCOHOLIC DRINK FIRST THING IN THE MORNING TO GET YOURSELF GOING AFTER A NIGHT OF HEAVY DRINKING: NEVER
HOW OFTEN DURING THE LAST YEAR HAVE YOU FAILED TO DO WHAT WAS NORMALLY EXPECTED FROM YOU BECAUSE OF DRINKING: NEVER

## 2024-10-24 NOTE — PROGRESS NOTES
Medicare Annual Wellness Visit    Modesto Hensley is here for Medicare AWV (Medicare Annual Wellness exam )    Assessment & Plan   Welcome to Medicare preventive visit  Need for prophylactic vaccination and inoculation against varicella  -     zoster recombinant adjuvanted vaccine (SHINGRIX) 50 MCG/0.5ML SUSR injection; Inject 0.5 mLs into the muscle once for 1 dose, Disp-0.5 mL, R-0Print    Recommendations for Preventive Services Due: see orders and patient instructions/AVS.  Recommended screening schedule for the next 5-10 years is provided to the patient in written form: see Patient Instructions/AVS.     Return for yearly physical.     Subjective     Unable to drive at night, has not had vision exam in the last 4 yr.   Not interested in having hearing tests.  Declines immunizations.     Patient's complete Health Risk Assessment and screening values have been reviewed and are found in Flowsheets. The following problems were reviewed today and where indicated follow up appointments were made and/or referrals ordered.    Positive Risk Factor Screenings with Interventions:         Alcohol Screening:  AUDIT-C Score: 10  AUDIT Total Score: 10  Total Score Interpretation: 8-14 suggests harmful or hazardous alcohol consumption in men   Interventions:  See AVS for additional education material              Inactivity:  On average, how many days per week do you engage in moderate to strenuous exercise (like a brisk walk)?: 0 days (!) Abnormal  On average, how many minutes do you engage in exercise at this level?: 0 min  Interventions:  See AVS for additional education material    Poor Eating Habits/Diet:  Do you eat balanced/healthy meals regularly?: (!) No  Interventions:  See AVS for additional education material    Abnormal BMI (obese):  Body mass index is 36.45 kg/m². (!) Abnormal  Interventions:  See AVS for additional education material          Vision Screen:  Do you have difficulty driving, watching TV, or doing

## 2024-10-24 NOTE — PATIENT INSTRUCTIONS
9 Ways to Cut Back on Drinking  Maybe you've found yourself drinking more alcohol than you'd prefer. If you want to cut back, here are some ideas to try.    Think before you drink.  Do you really want a drink, or is it just a habit? If you're used to having a drink at a certain time, try doing something else then.     Look for substitutes.  Find some no-alcohol drinks that you enjoy, like flavored seltzer water, tea with honey, or tonic with a slice of lime. Or try alcohol-free beer or \"virgin\" cocktails (without the alcohol).     Drink more water.  Use water to quench your thirst. Drink a glass of water before you have any alcohol. Have another glass along with every drink or between drinks.     Shrink your drink.  For example, have a bottle of beer instead of a pint. Use a smaller glass for wine. Choose drinks with lower alcohol content (ABV%). Or use less liquor and more mixer in cocktails.     Slow down.  It's easy to drink quickly and without thinking about it. Pay attention, and make each drink last longer.     Do the math.  Total up how much you spend on alcohol each month. How much is that a year? If you cut back, what could you do with the money you save?     Take a break.  Choose a day or two each week when you won't drink at all. Notice how you feel on those days, physically and emotionally. How did you sleep? Do you feel better? Over time, add more break days.     Count calories.  Would you like to lose some weight? For some people that's a good motivator for cutting back. Figure out how many calories are in each drink. How many does that add up to in a day? In a week? In a month?     Practice saying no.  Be ready when someone offers you a drink. Try: \"Thanks, I've had enough.\" Or \"Thanks, but I'm cutting back.\" Or \"No, thanks. I feel better when I drink less.\"   Current as of: November 15, 2023  Content Version: 14.2  © 2024 [employer].   Care instructions adapted under license by [facility]